# Patient Record
Sex: MALE | Race: OTHER | Employment: FULL TIME
[De-identification: names, ages, dates, MRNs, and addresses within clinical notes are randomized per-mention and may not be internally consistent; named-entity substitution may affect disease eponyms.]

---

## 2023-01-17 ENCOUNTER — OFFICE VISIT (OUTPATIENT)
Dept: INTERNAL MEDICINE CLINIC | Facility: CLINIC | Age: 58
End: 2023-01-17
Payer: COMMERCIAL

## 2023-01-17 VITALS
TEMPERATURE: 98.3 F | HEART RATE: 78 BPM | RESPIRATION RATE: 12 BRPM | HEIGHT: 64 IN | SYSTOLIC BLOOD PRESSURE: 132 MMHG | DIASTOLIC BLOOD PRESSURE: 78 MMHG | WEIGHT: 194 LBS | BODY MASS INDEX: 33.12 KG/M2 | OXYGEN SATURATION: 95 %

## 2023-01-17 DIAGNOSIS — Z79.899 OTHER LONG TERM (CURRENT) DRUG THERAPY: ICD-10-CM

## 2023-01-17 DIAGNOSIS — E78.5 HYPERLIPIDEMIA, UNSPECIFIED HYPERLIPIDEMIA TYPE: ICD-10-CM

## 2023-01-17 DIAGNOSIS — Z86.39 H/O ADRENAL INSUFFICIENCY: ICD-10-CM

## 2023-01-17 DIAGNOSIS — M17.11 PRIMARY OSTEOARTHRITIS OF RIGHT KNEE: Primary | ICD-10-CM

## 2023-01-17 DIAGNOSIS — Z79.899 MEDICATION MANAGEMENT: ICD-10-CM

## 2023-01-17 DIAGNOSIS — E03.9 ACQUIRED HYPOTHYROIDISM: ICD-10-CM

## 2023-01-17 DIAGNOSIS — Z01.818 PRE-OPERATIVE CLEARANCE: ICD-10-CM

## 2023-01-17 LAB
25(OH)D3 SERPL-MCNC: 12.1 NG/ML (ref 30–100)
ALBUMIN SERPL-MCNC: 3.3 G/DL (ref 3.5–5)
ANION GAP SERPL CALC-SCNC: 4 MMOL/L (ref 2–11)
APPEARANCE UR: CLEAR
BACTERIA URNS QL MICRO: NEGATIVE /HPF
BASOPHILS # BLD: 0.1 K/UL (ref 0–0.2)
BASOPHILS NFR BLD: 1 % (ref 0–2)
BILIRUB UR QL: NEGATIVE
BUN SERPL-MCNC: 15 MG/DL (ref 6–23)
CALCIUM SERPL-MCNC: 9.2 MG/DL (ref 8.3–10.4)
CASTS URNS QL MICRO: ABNORMAL /LPF (ref 0–2)
CHLORIDE SERPL-SCNC: 105 MMOL/L (ref 101–110)
CO2 SERPL-SCNC: 30 MMOL/L (ref 21–32)
COLOR UR: ABNORMAL
CREAT SERPL-MCNC: 0.6 MG/DL (ref 0.8–1.5)
DIFFERENTIAL METHOD BLD: ABNORMAL
EOSINOPHIL # BLD: 0.2 K/UL (ref 0–0.8)
EOSINOPHIL NFR BLD: 3 % (ref 0.5–7.8)
EPI CELLS #/AREA URNS HPF: ABNORMAL /HPF (ref 0–5)
ERYTHROCYTE [DISTWIDTH] IN BLOOD BY AUTOMATED COUNT: 12.7 % (ref 11.9–14.6)
EST. AVERAGE GLUCOSE BLD GHB EST-MCNC: 117 MG/DL
GLUCOSE SERPL-MCNC: 98 MG/DL (ref 65–100)
GLUCOSE UR STRIP.AUTO-MCNC: NEGATIVE MG/DL
HBA1C MFR BLD: 5.7 % (ref 4.8–5.6)
HCT VFR BLD AUTO: 40.4 % (ref 41.1–50.3)
HGB BLD-MCNC: 13.6 G/DL (ref 13.6–17.2)
HGB UR QL STRIP: ABNORMAL
IMM GRANULOCYTES # BLD AUTO: 0 K/UL (ref 0–0.5)
IMM GRANULOCYTES NFR BLD AUTO: 0 % (ref 0–5)
INR PPP: 1
KETONES UR QL STRIP.AUTO: NEGATIVE MG/DL
LEUKOCYTE ESTERASE UR QL STRIP.AUTO: NEGATIVE
LYMPHOCYTES # BLD: 3 K/UL (ref 0.5–4.6)
LYMPHOCYTES NFR BLD: 37 % (ref 13–44)
MCH RBC QN AUTO: 30.4 PG (ref 26.1–32.9)
MCHC RBC AUTO-ENTMCNC: 33.7 G/DL (ref 31.4–35)
MCV RBC AUTO: 90.2 FL (ref 82–102)
MONOCYTES # BLD: 0.4 K/UL (ref 0.1–1.3)
MONOCYTES NFR BLD: 5 % (ref 4–12)
NEUTS SEG # BLD: 4.4 K/UL (ref 1.7–8.2)
NEUTS SEG NFR BLD: 54 % (ref 43–78)
NITRITE UR QL STRIP.AUTO: NEGATIVE
NRBC # BLD: 0 K/UL (ref 0–0.2)
PH UR STRIP: 5 (ref 5–9)
PLATELET # BLD AUTO: 325 K/UL (ref 150–450)
PMV BLD AUTO: 10.8 FL (ref 9.4–12.3)
POTASSIUM SERPL-SCNC: 4 MMOL/L (ref 3.5–5.1)
PROT UR STRIP-MCNC: NEGATIVE MG/DL
PROTHROMBIN TIME: 13.6 SEC (ref 12.6–14.3)
RBC # BLD AUTO: 4.48 M/UL (ref 4.23–5.6)
RBC #/AREA URNS HPF: ABNORMAL /HPF (ref 0–5)
SODIUM SERPL-SCNC: 139 MMOL/L (ref 133–143)
SP GR UR REFRACTOMETRY: 1.02 (ref 1–1.02)
UROBILINOGEN UR QL STRIP.AUTO: 0.2 EU/DL (ref 0.2–1)
WBC # BLD AUTO: 8 K/UL (ref 4.3–11.1)
WBC URNS QL MICRO: ABNORMAL /HPF (ref 0–4)

## 2023-01-17 PROCEDURE — 93000 ELECTROCARDIOGRAM COMPLETE: CPT | Performed by: INTERNAL MEDICINE

## 2023-01-17 PROCEDURE — 99204 OFFICE O/P NEW MOD 45 MIN: CPT | Performed by: INTERNAL MEDICINE

## 2023-01-17 ASSESSMENT — ENCOUNTER SYMPTOMS
GASTROINTESTINAL NEGATIVE: 1
RESPIRATORY NEGATIVE: 1
EYES NEGATIVE: 1

## 2023-01-17 NOTE — PROGRESS NOTES
Stephanie Bowie D.O. [facility]  Jaqueline Diadema [address] [place] [id number]  Tel: [phone]    Preoperative Clearance Visit     Patient Name: Sheree Mathews    :  1965   MRN:   [id number]      Today's Date: 23 11:46 AM    Subjective     The patient is a 65y.o. year old male with a pmh as listed below. he presents today for preoperative surgical clearance. Patient is scheduled to have a R TKA on 2023. Patient states he is doing well with no acute complaints. He denies any current chest pain or shortness of breath. History of strokes/TIA/cerebrovascular disease: NONE  History myocardial infarction/unstable angina/heart disease/CHF: NONE  History of sleep apnea: NO  History of previous surgery complications: S/p right talonavicular fusion, excision exostosis navicular cuneiform joints, calcaneal autograft, and iliac crest BMA on  with no complications; knee surgery years ago, but replacement  History of anesthesia complications: NONE    Review of Systems   Constitutional: Negative. HENT: Negative. Eyes: Negative. Respiratory: Negative. Cardiovascular: Negative. Gastrointestinal: Negative. Genitourinary: Negative. Musculoskeletal:  Positive for arthralgias. Skin: Negative. Neurological: Negative. Hematological: Negative. Psychiatric/Behavioral: Negative. No past medical history on file.   Social History     Socioeconomic History    Marital status:      Spouse name: Not on file    Number of children: Not on file    Years of education: Not on file    Highest education level: Not on file   Occupational History    Not on file   Tobacco Use    Smoking status: Not on file    Smokeless tobacco: Not on file   Substance and Sexual Activity    Alcohol use: Not on file    Drug use: Not on file    Sexual activity: Not on file   Other Topics Concern    Not on file   Social History Narrative    Not on file     Social Determinants of Health     Financial Resource Strain: Not on file   Food Insecurity: Not on file   Transportation Needs: Not on file   Physical Activity: Not on file   Stress: Not on file   Social Connections: Not on file   Intimate Partner Violence: Not on file   Housing Stability: Not on file         No current outpatient medications on file. No current facility-administered medications for this visit. Objective     Vitals:    01/17/23 1135   BP: 132/78   Site: Left Upper Arm   Position: Sitting   Cuff Size: Large Adult   Pulse: 78   Resp: 12   Temp: 98.3 °F (36.8 °C)   TempSrc: Temporal   SpO2: 95%   Weight: 194 lb (88 kg)   Height: 5' 4\" (1.626 m)      Physical Exam:  Constitutional: Appears well kempt. Alert/oriented x3. In no acute distress. Head: Normocephalic No trauma. No deformity. No bruits. Neck: Supple. ROM normal. No tenderness. No masses. Eyes: PERRLA. Conjunctivae normal. No discharge. Ears: External ears normal. TM normal. No discharge from ears. Nose: Nose normal. Nares patent. Throat: Clear. No exudates. No erythema. Cardiac: Heart with normal rate/rhythm. No murmurs. No gallops. Pulses normal.   Pulmonary: Lungs clear to auscultation bilaterally. In no respiratory distress. No wheezing. No rales. No rhonci. Gastrointestinal: Bowel sounds present. Abdomen soft and nondistended. Musculoskeletal: Right knee with mild flexion contracture and full flexion. Tenderness to palpation on the medial lateral joint lines. No laxity to varus and valgus stress testing. No effusion appreciated. Skin: No rashes or abnormal moles. No lesions. Neurological: No numbness. No tingling. Alert and oriented. At baseline. No confusion. Psychiatric: Normal thought content. Normal behavior. Normal judgment.      Recommendations     Assessment:  Patient Active Problem List   Diagnosis    Primary osteoarthritis of right knee    Hypothyroidism    Hyperlipidemia    H/O adrenal insufficiency SLEEP APNEA: NONE    RCRI score: 0 points; Class I Risk    METS: If he had a good knee he could do any activity he would like without taking a break. Plan:  -Check CBC, BMP, UA w/ micro, HbA1c, PT/INR, vitamin D, albumin, EKG, and CXR  -Patient is cleared for surgery pending laboratory, EKG, and chest XRAY results  -All the patient's chronic illnesses and medications were reviewed in the context of surgery risk. Appropriate labs and testing were ordered based on these chronic illnesses and medications to determine stability and further risk stratify for surgery. Labs requested by performing surgeon ordered.  -The patient expresses understanding of the plan as I've explained it to him and is in agreement with the current plan. -Follow up with PCP post operatively    -Previous medical records and/or labs/tests available to me reviewed, any records outstanding not available requested  -The risks, benefits, and medical necessity of all medications, tests labs, and any other orders that were ordered at today's visit were discussed with the patient including all elective or patient requested orders. Decision to order or not order tests or based on this risk/benefit ratio and medical necessity. Time Spent in Patient Room: 20 minutes  Time Spent Pre- and Post Reviewing patient's chart: 25 minutes  Total Time: 45 minutes    Signed: Carolina Spatz, D.O.  01/17/23  11:46 AM    ADDENDUM:  ALL Labs and tests (EKG, CXR) reviewed    Patient is medically cleared for surgery.

## 2024-01-22 ENCOUNTER — TELEPHONE (OUTPATIENT)
Dept: PULMONOLOGY | Age: 59
End: 2024-01-22

## 2024-01-22 NOTE — TELEPHONE ENCOUNTER
Note:  Outside referral from Reina Hawkins, FNP for abnormal CXR, request stat eval due to upcoming surgery. Ref sent to scanning with notes. New CXR report requested with disc of imaging. Sending to triage for review and scheduling. Current CXR not in PACS. CXR 1/2023 images are in PACS:        Shannon-have we received recent CXR disc noted you requested?

## 2024-01-25 NOTE — PROGRESS NOTES
Patient Name:  Jorge Perea                               YOB: 1965  MRN: [id number]                                                Office Visit 1/28/2024    ASSESSMENT AND PLAN:  (Medical Decision Making)    1. Abnormal chest x-ray  Respiratory examination, spirometry and oximetry are normal today.  Will obtain CT of the chest to further assess chest x-ray abnormalities, which are unlikely to preclude upcoming orthopedic surgery  - CT CHEST WO CONTRAST; Future    2. Preop respiratory exam  Given the lack of a pulmonary diagnosis at this point, there is no clear contraindication to orthopedic surgery.  No further testing is required prior to left TKA.  How could you figure this No orders of the defined types were placed in this encounter.    Gudelia Ash MD    Total time for encounter on day of encounter was 45 minutes.  This time includes chart prep, review of tests/procedures, review of other provider's notes, documentation and counseling patient regarding disease process and medications.    ___________________________________________________________________         ______      REASON FOR VISIT:   Chief Complaint   Patient presents with    Abnormal Chest X-ray    New Patient       HISTORY OF PRESENT ILLNESS:    Mr. Jorge Perea is a 58 y.o. 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 Pj Shetty.  He had an abnormal CXR at his PCP providers office and was referred to [facility].    He denies any shortness of breath or chronic cough.  There has been no unintentional weight loss no hemoptysis no significant orthopnea or swelling.  He does not recall any prior exposure to tuberculosis or significant lung infections.  His chest x-ray is read as having some bronchial abnormalities which are

## 2024-01-26 ENCOUNTER — OFFICE VISIT (OUTPATIENT)
Dept: PULMONOLOGY | Age: 59
End: 2024-01-26
Payer: COMMERCIAL

## 2024-01-26 VITALS
OXYGEN SATURATION: 97 % | RESPIRATION RATE: 16 BRPM | HEIGHT: 64 IN | BODY MASS INDEX: 31.58 KG/M2 | HEART RATE: 79 BPM | TEMPERATURE: 98.2 F | DIASTOLIC BLOOD PRESSURE: 78 MMHG | WEIGHT: 185 LBS | SYSTOLIC BLOOD PRESSURE: 138 MMHG

## 2024-01-26 DIAGNOSIS — Z01.811 PREOP RESPIRATORY EXAM: ICD-10-CM

## 2024-01-26 DIAGNOSIS — R93.89 ABNORMAL CHEST X-RAY: Primary | ICD-10-CM

## 2024-01-26 PROCEDURE — 99204 OFFICE O/P NEW MOD 45 MIN: CPT | Performed by: INTERNAL MEDICINE

## 2024-01-26 RX ORDER — PREDNISONE 10 MG/1
1 TABLET ORAL DAILY
COMMUNITY

## 2024-01-26 RX ORDER — LEVOTHYROXINE SODIUM 137 UG/1
1 TABLET ORAL DAILY
COMMUNITY